# Patient Record
Sex: MALE | Race: BLACK OR AFRICAN AMERICAN | HISPANIC OR LATINO
[De-identification: names, ages, dates, MRNs, and addresses within clinical notes are randomized per-mention and may not be internally consistent; named-entity substitution may affect disease eponyms.]

---

## 2022-06-06 PROBLEM — Z00.00 ENCOUNTER FOR PREVENTIVE HEALTH EXAMINATION: Status: ACTIVE | Noted: 2022-06-06

## 2022-06-07 ENCOUNTER — NON-APPOINTMENT (OUTPATIENT)
Age: 52
End: 2022-06-07

## 2022-06-08 ENCOUNTER — APPOINTMENT (OUTPATIENT)
Dept: COLORECTAL SURGERY | Facility: CLINIC | Age: 52
End: 2022-06-08
Payer: COMMERCIAL

## 2022-06-08 VITALS
TEMPERATURE: 97.9 F | WEIGHT: 186 LBS | BODY MASS INDEX: 25.19 KG/M2 | SYSTOLIC BLOOD PRESSURE: 121 MMHG | HEART RATE: 57 BPM | HEIGHT: 72 IN | DIASTOLIC BLOOD PRESSURE: 79 MMHG

## 2022-06-08 DIAGNOSIS — Z82.49 FAMILY HISTORY OF ISCHEMIC HEART DISEASE AND OTHER DISEASES OF THE CIRCULATORY SYSTEM: ICD-10-CM

## 2022-06-08 DIAGNOSIS — Z82.3 FAMILY HISTORY OF STROKE: ICD-10-CM

## 2022-06-08 DIAGNOSIS — Z83.3 FAMILY HISTORY OF DIABETES MELLITUS: ICD-10-CM

## 2022-06-08 PROCEDURE — A4550 SURGICAL TRAYS: CPT

## 2022-06-08 PROCEDURE — 99203 OFFICE O/P NEW LOW 30 MIN: CPT | Mod: 25

## 2022-06-08 PROCEDURE — 46221 LIGATION OF HEMORRHOID(S): CPT

## 2022-06-08 NOTE — PHYSICAL EXAM
[FreeTextEntry1] : Medical assistant present for duration of physical examination\par \par General no acute distress, alert and oriented\par Psych calm, pleasant demeanor, responding appropriately to questions\par Nonlabored breathing\par Ambulating without assistance\par Skin normal color and pigment, no visible lesions or rashes\par \par Anorectal Exam:\par Inspection no erythema, induration or fluctuance, no skin excoriation, no fissure, no external hemorrhoidal inflammation or tags\par MALLORY nontender, no masses palpated, no blood on gloved finger\par \par Procedure: Anoscopy\par \par Pre procedure Diagnosis: hemorrhoids\par Post procedure Diagnosis: hemorrhoids\par Anesthesia: none\par Estimated blood loss: none\par Specimen: none\par Complications: none\par \par Consent obtained. Anoscopy was performed by passing a lighted anoscope with lubricant jelly into the anal canal and the entire anal mucosal surface was inspected. Findings included no fissure, moderate internal hemorrhoids - left lateral and right posterior, no active bleeding \par \par Patient tolerated examination and procedure well.\par \par \par

## 2022-06-08 NOTE — ASSESSMENT
[FreeTextEntry1] : Exam findings and diagnosis were discussed at length with patient. \par Recommendations including increased fiber intake, adequate daily hydration, and sitz baths as needed and after bowel movements were discussed.\par Fiber goal 25-30g/day, consider psyllium supplement. Educational material regarding fiber provided\par Adequate oral hydration - goal 64oz water/day\par Avoid constipation and diarrhea, avoid pushing/straining.\par Management options discussed.\par Recommend rubber band ligation. Patient agreeable and understands multiple treatments may be performed for multiple hemorrhoids.\par First ligation performed today - left lateral hemorrhoid.\par Post-procedure instructions were reviewed with the patient, including recommendation to notify office immediately for any symptoms of severe pain, bleeding, inability to urinate, fever, or any other concern. \par F/u 3 weeks or sooner as needed.\par All questions answered, patient expressed understanding and is agreeable to this plan.\par

## 2022-06-08 NOTE — PROCEDURE
[FreeTextEntry1] : Rubber Band Ligation\par \par Pre-procedure Diagnosis: Symptomatic Internal Hemorrhoids\par Post-procedure Diagnosis: Symptomatic Internal Hemorrhoids\par Procedure: Anoscopy with Rubber Band Ligation\par Anesthesia: none\par Estimated blood loss: minimal\par Specimen: none\par Drain: none\par Complications: none\par \par Indications: Patient presents with history of symptoms related to internal hemorrhoids. On physical exam, moderate internal hemorrhoids were detected. Risks/benefits/alternative were discussed and patient agreed to proceed with office based procedure.\par \par Procedure Details: Consent obtained. Patient was placed in a modified prone fei-knife position on the examination table. Digital rectal exam was performed with an index finger with surgical jelly lubricant. An anoscope was inserted into the anal canal, and a prominent hemorrhoidal bundle was identified in the left lateral position. Using the hemorrhoid ligation device, a rubber band was placed around this hemorrhoid. No active bleeding was noted. The anoscope was removed. The patient tolerated the procedure well.\par \par \par

## 2022-06-08 NOTE — HISTORY OF PRESENT ILLNESS
[FreeTextEntry1] : 53 yo M presents for evaluation of hemorrhoids\par \par c/o of intermittent episodes of BRB in the setting of straining over the last few years. Pt may note BRB on TP, stool or toilet bowl which resolves the next day and won't see if for several weeks or months.\par Also admits to occasional swollen, non tender tissue when straining\par He has not tried OTC creams\par Denies pain, itching or burning\par \par Underwent colonoscopy w/ Dr. Rigo Ha 5/18/22 (no report available) hemorrhoids noted, but  suboptimal prep and patient advised to repeat again (no scheduled date)\par \par BH: typically daily, admits to occasional constipation which results in no BM for a day or two and harder stools\par Admits could improve intake of dietary fiber and water\par Not taking use of stool softeners or fiber supplements\par \par Denies FMH colorectal CA\par No ASA/NSAIDs in last 7 days\par

## 2022-08-18 ENCOUNTER — APPOINTMENT (OUTPATIENT)
Dept: COLORECTAL SURGERY | Facility: CLINIC | Age: 52
End: 2022-08-18

## 2022-08-18 VITALS
BODY MASS INDEX: 25.06 KG/M2 | WEIGHT: 185 LBS | HEIGHT: 72 IN | DIASTOLIC BLOOD PRESSURE: 81 MMHG | HEART RATE: 71 BPM | TEMPERATURE: 97.1 F | SYSTOLIC BLOOD PRESSURE: 124 MMHG

## 2022-08-18 DIAGNOSIS — K64.8 OTHER HEMORRHOIDS: ICD-10-CM

## 2022-08-18 PROCEDURE — 46600 DIAGNOSTIC ANOSCOPY SPX: CPT

## 2022-08-18 NOTE — ASSESSMENT
[FreeTextEntry1] : Exam findings were discussed at length with patient.\par Hemorrhoid symptoms resolved after first rubber banding.\par Right posterior hemorrhoid still seen on anoscopy, patient declines further treatment today and would like to focus on supportive care. \par Continue bowel regimen, sitz baths, and hydrocortisone prn.\par F/u if symptoms return or persist\par F/u with GI for colonoscopy.\par All questions were answered, patient expressed understanding, and is agreeable to this plan.\par

## 2022-08-18 NOTE — HISTORY OF PRESENT ILLNESS
[FreeTextEntry1] : 53 yo M presents for follow up \par \par Seen for initial evaluation of hemorrhoids on 6/8/22\par \par c/o BRBPR. Underwent colonoscopy w/ Dr. Rigo Ha 5/18/22 (no report available) hemorrhoids noted, but suboptimal prep and patient advised to repeat again (no scheduled date). Patient was referred for hemorrhoid management.\par \par Exam, including anoscopy, noted moderate internal hemorrhoids - left lateral and right posterior. \par Rubber band ligation of left lateral hemorrhoid was performed at that visit.\par \par Returns today in follow up\par \par Pt reports he tolerated banding well and only experienced a couple days of discomfort which resolved. Reports his hemorrhoid symptoms have resolved with the banding. Denies complaints at present\par Denies pain,itching, burning or bleeding\par \par BH: daily, denies straining or constipation\par Eating more dietary fiber and drinking adequate water\par Not taking stool softener or fiber supplementation\par Denies ASA/NSAID use in the last week\par Of note, he has not contacted GI's office to r/s colonoscopy, but plans to do so after evaluation with this office

## 2022-08-18 NOTE — PHYSICAL EXAM
[FreeTextEntry1] : Medical assistant present for duration of physical examination\par \par General no acute distress, alert and oriented\par Psych calm, pleasant, in good spirits\par Nonlabored breathing\par Ambulating without assistance\par Skin normal color and pigment\par \par Anorectal Exam:\par Inspection no skin changes, no external hemorrhoidal inflammation or tags\par MALLORY nontender, no masses palpated, no blood on gloved finger\par \par Procedure: Anoscopy\par \par Pre procedure Diagnosis: hemorrhoids\par Post procedure Diagnosis: hemorrhoids\par Anesthesia: none\par Estimated blood loss: none\par Specimen: none\par Complications: none\par \par Consent obtained. Anoscopy was performed by passing a lighted anoscope with lubricant jelly into the anal canal and the entire anal mucosal surface was inspected. Findings included no fissure, post ligation scar left lateral anal canal, moderate internal hemorrhoid seen right posterior, no active bleeding \par \par Patient tolerated examination and procedure well.\par \par \par